# Patient Record
Sex: FEMALE | Race: BLACK OR AFRICAN AMERICAN | NOT HISPANIC OR LATINO | Employment: FULL TIME | URBAN - METROPOLITAN AREA
[De-identification: names, ages, dates, MRNs, and addresses within clinical notes are randomized per-mention and may not be internally consistent; named-entity substitution may affect disease eponyms.]

---

## 2020-12-02 ENCOUNTER — TELEPHONE (OUTPATIENT)
Dept: GASTROENTEROLOGY | Facility: CLINIC | Age: 65
End: 2020-12-02

## 2020-12-02 NOTE — TELEPHONE ENCOUNTER
----- Message from Lyndsey Freeman LPN sent at 12/1/2020  3:08 PM CST -----  Contact: patient    ----- Message -----  From: Shayy Riggins  Sent: 12/1/2020   2:09 PM CST  To: [facility] Gastro Clinical Staff    Type: Needs Medical Advice  Who Called:  patient    Best Call Back Number: [phone] (home)     Additional Information: would like to have a colonoscopy

## 2020-12-02 NOTE — TELEPHONE ENCOUNTER
Spoke with pt and discussed dates available to schedule cscope. She will call us back at a later time to schedule. Phone number provided.

## 2020-12-04 ENCOUNTER — TELEPHONE (OUTPATIENT)
Dept: GASTROENTEROLOGY | Facility: CLINIC | Age: 65
End: 2020-12-04

## 2020-12-04 DIAGNOSIS — Z01.812 ENCOUNTER FOR PREOPERATIVE SCREENING LABORATORY TESTING FOR COVID-19 VIRUS: ICD-10-CM

## 2020-12-04 DIAGNOSIS — Z11.52 ENCOUNTER FOR PREOPERATIVE SCREENING LABORATORY TESTING FOR COVID-19 VIRUS: ICD-10-CM

## 2020-12-17 ENCOUNTER — TELEPHONE (OUTPATIENT)
Dept: GASTROENTEROLOGY | Facility: CLINIC | Age: 65
End: 2020-12-17

## 2020-12-17 NOTE — TELEPHONE ENCOUNTER
----- Message from Bety Ibarra sent at 12/17/2020  7:28 AM CST -----  Regarding: advice  Contact: self  Type: Needs Medical Advice  Who Called:  self  Symptoms (please be specific):    How long has patient had these symptoms:    Pharmacy name and phone #:  [facility]  Best Call Back Number: [phone]   Additional Information: Patient states the medication for prep for the procedure is $ 42.00. Patient requesting a less expensive medication. The wife requesting to get medication for prep no later than tomorrow. The pharmacy is closed Saturday.

## 2020-12-18 ENCOUNTER — TELEPHONE (OUTPATIENT)
Dept: GASTROENTEROLOGY | Facility: CLINIC | Age: 65
End: 2020-12-18

## 2020-12-18 NOTE — TELEPHONE ENCOUNTER
----- Message from Carla Heart sent at 12/18/2020  8:56 AM CST -----  Contact: Pt  Pt has colonoscopy scheduled for Tuesday 12/22 and was supposed to have some instructions emailed to her but has not received them. Please call back at [phone] or email instructions to [email or web address]

## 2020-12-19 ENCOUNTER — LAB VISIT (OUTPATIENT)
Dept: FAMILY MEDICINE | Facility: CLINIC | Age: 65
End: 2020-12-19
Payer: MEDICARE

## 2020-12-19 DIAGNOSIS — Z01.812 ENCOUNTER FOR PREOPERATIVE SCREENING LABORATORY TESTING FOR COVID-19 VIRUS: ICD-10-CM

## 2020-12-19 DIAGNOSIS — Z11.52 ENCOUNTER FOR PREOPERATIVE SCREENING LABORATORY TESTING FOR COVID-19 VIRUS: ICD-10-CM

## 2020-12-19 PROCEDURE — U0003 INFECTIOUS AGENT DETECTION BY NUCLEIC ACID (DNA OR RNA); SEVERE ACUTE RESPIRATORY SYNDROME CORONAVIRUS 2 (SARS-COV-2) (CORONAVIRUS DISEASE [COVID-19]), AMPLIFIED PROBE TECHNIQUE, MAKING USE OF HIGH THROUGHPUT TECHNOLOGIES AS DESCRIBED BY CMS-2020-01-R: HCPCS

## 2020-12-20 LAB — SARS-COV-2 RNA RESP QL NAA+PROBE: NOT DETECTED

## 2020-12-22 ENCOUNTER — ANESTHESIA (OUTPATIENT)
Dept: ENDOSCOPY | Facility: HOSPITAL | Age: 65
End: 2020-12-22
Payer: MEDICARE

## 2020-12-22 ENCOUNTER — ANESTHESIA EVENT (OUTPATIENT)
Dept: ENDOSCOPY | Facility: HOSPITAL | Age: 65
End: 2020-12-22
Payer: MEDICARE

## 2020-12-22 ENCOUNTER — HOSPITAL ENCOUNTER (OUTPATIENT)
Facility: HOSPITAL | Age: 65
Discharge: HOME | End: 2020-12-22
Attending: INTERNAL MEDICINE | Admitting: INTERNAL MEDICINE
Payer: MEDICARE

## 2020-12-22 VITALS
HEIGHT: 65 IN | SYSTOLIC BLOOD PRESSURE: 117 MMHG | BODY MASS INDEX: 29.16 KG/M2 | DIASTOLIC BLOOD PRESSURE: 73 MMHG | RESPIRATION RATE: 18 BRPM | HEART RATE: 69 BPM | OXYGEN SATURATION: 97 % | TEMPERATURE: 98 F | WEIGHT: 175 LBS

## 2020-12-22 DIAGNOSIS — Z12.11 COLON CANCER SCREENING: ICD-10-CM

## 2020-12-22 PROCEDURE — 88305 TISSUE EXAM BY PATHOLOGIST: ICD-10-PCS | Mod: 26,,, | Performed by: PATHOLOGY

## 2020-12-22 PROCEDURE — 37000009 HC ANESTHESIA EA ADD 15 MINS: Performed by: INTERNAL MEDICINE

## 2020-12-22 PROCEDURE — D9220A PRA ANESTHESIA: ICD-10-PCS | Mod: PT,ANES,, | Performed by: ANESTHESIOLOGY

## 2020-12-22 PROCEDURE — 88305 TISSUE EXAM BY PATHOLOGIST: CPT | Performed by: PATHOLOGY

## 2020-12-22 PROCEDURE — 45380 COLONOSCOPY AND BIOPSY: CPT | Mod: PT,,, | Performed by: INTERNAL MEDICINE

## 2020-12-22 PROCEDURE — 27201012 HC FORCEPS, HOT/COLD, DISP: Performed by: INTERNAL MEDICINE

## 2020-12-22 PROCEDURE — 88305 TISSUE EXAM BY PATHOLOGIST: CPT | Mod: 26,,, | Performed by: PATHOLOGY

## 2020-12-22 PROCEDURE — 63600175 PHARM REV CODE 636 W HCPCS: Performed by: REGISTERED NURSE

## 2020-12-22 PROCEDURE — 37000008 HC ANESTHESIA 1ST 15 MINUTES: Performed by: INTERNAL MEDICINE

## 2020-12-22 PROCEDURE — 25000003 PHARM REV CODE 250: Performed by: REGISTERED NURSE

## 2020-12-22 PROCEDURE — D9220A PRA ANESTHESIA: Mod: PT,CRNA,, | Performed by: REGISTERED NURSE

## 2020-12-22 PROCEDURE — D9220A PRA ANESTHESIA: ICD-10-PCS | Mod: PT,CRNA,, | Performed by: REGISTERED NURSE

## 2020-12-22 PROCEDURE — D9220A PRA ANESTHESIA: Mod: PT,ANES,, | Performed by: ANESTHESIOLOGY

## 2020-12-22 PROCEDURE — 45380 COLONOSCOPY AND BIOPSY: CPT | Performed by: INTERNAL MEDICINE

## 2020-12-22 PROCEDURE — 45380 PR COLONOSCOPY,BIOPSY: ICD-10-PCS | Mod: PT,,, | Performed by: INTERNAL MEDICINE

## 2020-12-22 RX ORDER — OLMESARTAN MEDOXOMIL AND HYDROCHLOROTHIAZIDE 40/12.5 40; 12.5 MG/1; MG/1
0.5 TABLET ORAL DAILY
COMMUNITY

## 2020-12-22 RX ORDER — ATORVASTATIN CALCIUM 10 MG/1
20 TABLET, FILM COATED ORAL NIGHTLY
COMMUNITY

## 2020-12-22 RX ORDER — SODIUM CHLORIDE 9 MG/ML
INJECTION, SOLUTION INTRAVENOUS CONTINUOUS
Status: DISCONTINUED | OUTPATIENT
Start: 2020-12-22 | End: 2020-12-22 | Stop reason: HOSPADM

## 2020-12-22 RX ORDER — LIDOCAINE HYDROCHLORIDE 20 MG/ML
INJECTION INTRAVENOUS
Status: DISCONTINUED | OUTPATIENT
Start: 2020-12-22 | End: 2020-12-22

## 2020-12-22 RX ORDER — PROPOFOL 10 MG/ML
VIAL (ML) INTRAVENOUS
Status: DISCONTINUED | OUTPATIENT
Start: 2020-12-22 | End: 2020-12-22

## 2020-12-22 RX ADMIN — PROPOFOL 70 MG: 10 INJECTION, EMULSION INTRAVENOUS at 12:12

## 2020-12-22 RX ADMIN — SODIUM CHLORIDE 1000 ML: 0.9 SOLUTION INTRAVENOUS at 12:12

## 2020-12-22 RX ADMIN — SODIUM CHLORIDE 200 ML: 0.9 SOLUTION INTRAVENOUS at 01:12

## 2020-12-22 RX ADMIN — PROPOFOL 30 MG: 10 INJECTION, EMULSION INTRAVENOUS at 01:12

## 2020-12-22 RX ADMIN — SODIUM CHLORIDE 300 ML: 0.9 SOLUTION INTRAVENOUS at 01:12

## 2020-12-22 RX ADMIN — PROPOFOL 50 MG: 10 INJECTION, EMULSION INTRAVENOUS at 12:12

## 2020-12-22 RX ADMIN — LIDOCAINE HYDROCHLORIDE 100 MG: 20 INJECTION, SOLUTION INTRAVENOUS at 12:12

## 2020-12-22 NOTE — TRANSFER OF CARE
"Anesthesia Transfer of Care Note    Patient: Ambar Rivera    Procedure(s) Performed: Procedure(s) (LRB):  COLONOSCOPY (N/A)    Patient location: GI    Anesthesia Type: general    Transport from OR: Transported from OR on 2-3 L/min O2 by NC with adequate spontaneous ventilation    Post pain: adequate analgesia    Post assessment: tolerated procedure well and no apparent anesthetic complications    Post vital signs: stable    Level of consciousness: sedated    Nausea/Vomiting: no nausea/vomiting    Complications: none    Transfer of care protocol was followed      Last vitals:   Visit Vitals  /86 (BP Location: Left arm, Patient Position: Lying)   Pulse 86   Temp 36.6 °C (97.9 °F) (Skin)   Resp 17   Ht 5' 5" (1.651 m)   Wt 79.4 kg (175 lb)   SpO2 100%   Breastfeeding No   BMI 29.12 kg/m²     "

## 2020-12-22 NOTE — H&P
History & Physical - Short Stay  Gastroenterology    SUBJECTIVE:     Procedure: Colonoscopy    Chief Complaint/Indication for Procedure: Screening    PTA Medications   Medication Sig    atorvastatin (LIPITOR) 10 MG tablet Take 20 mg by mouth every evening.    olmesartan-hydrochlorothiazide (BENICAR HCT) 40-12.5 mg Tab Take 0.5 tablets by mouth once daily.     Review of patient's allergies indicates:  No Known Allergies     Past Medical History:   Diagnosis Date    Hyperlipidemia     Hypertension      Past Surgical History:   Procedure Laterality Date    BILATERAL TUBAL LIGATION  1985     History reviewed. No pertinent family history.  Social History     Tobacco Use    Smoking status: Never Smoker    Smokeless tobacco: Never Used   Substance Use Topics    Alcohol use: Never     Frequency: Never    Drug use: Never     OBJECTIVE:     Vital Signs (Most Recent)  Temp: 97.9 °F (36.6 °C) (12/22/20 1116)  Pulse: 86 (12/22/20 1116)  Resp: 17 (12/22/20 1116)  BP: 120/86 (12/22/20 1116)  SpO2: 100 % (12/22/20 1116)    Physical Exam:                                                       GENERAL:  Comfortable, in no acute distress.                                 HEENT EXAM:  Nonicteric.  No adenopathy.  Oropharynx is clear.               NECK:  Supple.                                                               LUNGS:  Clear.                                                               CARDIAC:  Regular rate and rhythm.  S1, S2.  No murmur.                      ABDOMEN:  Soft, positive bowel sounds, nontender.  No hepatosplenomegaly or masses.  No rebound or guarding.                                             EXTREMITIES:  No edema.     MENTAL STATUS:  Normal, alert and oriented.    ASSESSMENT/PLAN:     Assessment: Screening    Plan: Colonoscopy    Anesthesia Plan: General    ASA Grade: ASA 2 - Patient with mild systemic disease with no functional limitations    MALLAMPATI SCORE:  II (hard and soft palate, upper  portion of tonsils anduvula visible)

## 2020-12-22 NOTE — ANESTHESIA PREPROCEDURE EVALUATION
12/22/2020  Ambar Rivera is a 65 y.o., female.    Anesthesia Evaluation          Review of Systems  Anesthesia Hx:  No problems with previous Anesthesia   Social:  Non-Smoker    Cardiovascular:   Hypertension    Pulmonary:  Pulmonary Normal    Hepatic/GI:   Bowel Prep.    Neurological:  Neurology Normal    Endocrine:  Endocrine Normal        Physical Exam  General:  Obesity                 Anesthesia Plan  Type of Anesthesia, risks & benefits discussed:  Anesthesia Type:  general  Patient's Preference:   Intra-op Monitoring Plan: standard ASA monitors  Intra-op Monitoring Plan Comments:   Post Op Pain Control Plan:   Post Op Pain Control Plan Comments:   Induction:   IV  Beta Blocker:  Patient is not currently on a Beta-Blocker (No further documentation required).       Informed Consent: Patient understands risks and agrees with Anesthesia plan.  Questions answered. Anesthesia consent signed with patient.  ASA Score: 2     Day of Surgery Review of History & Physical:    H&P update referred to the surgeon.     Anesthesia Plan Notes: I have personally evaluated the patient and discussed risk/benefits/alternatives of general anesthesia.        Ready For Surgery From Anesthesia Perspective.

## 2020-12-22 NOTE — [employer] PATIENT INSTRUCTIONS
Discharge Summary/Instructions after an Endoscopic Procedure  Patient Name: Ambar Rivera  Patient MRN: [id number]  Patient YOB: 1955  Tuesday, December 22, 2020  Syed Aguirre MD  RESTRICTIONS:  During your procedure today, you received medications for sedation.  These   medications may affect your judgment, balance and coordination.  Therefore,   for 24 hours, you have the following restrictions:   - DO NOT drive a car, operate machinery, make legal/financial decisions,   sign important papers or drink alcohol.    ACTIVITY:  Today: no heavy lifting, straining or running due to procedural   sedation/anesthesia.  The following day: return to full activity including work.  DIET:  Eat and drink normally unless instructed otherwise.     TREATMENT FOR COMMON SIDE EFFECTS:  - Mild abdominal pain, nausea, belching, bloating or excessive gas:  rest,   eat lightly and use a heating pad.  - Sore Throat: treat with throat lozenges and/or gargle with warm salt   water.  - Because air was used during the procedure, expelling large amounts of air   from your rectum or belching is normal.  - If a bowel prep was taken, you may not have a bowel movement for 1-3 days.    This is normal.  SYMPTOMS TO WATCH FOR AND REPORT TO YOUR PHYSICIAN:  1. Abdominal pain or bloating, other than gas cramps.  2. Chest pain.  3. Back pain.  4. Signs of infection such as: chills or fever occurring within 24 hours   after the procedure.  5. Rectal bleeding, which would show as bright red, maroon, or black stools.   (A tablespoon of blood from the rectum is not serious, especially if   hemorrhoids are present.)  6. Vomiting.  7. Weakness or dizziness.  GO DIRECTLY TO THE NEAREST EMERGENCY ROOM IF YOU HAVE ANY OF THE FOLLOWING:      Difficulty breathing              Chills and/or fever over 101 F   Persistent vomiting and/or vomiting blood   Severe abdominal pain   Severe chest pain   Black, tarry stools   Bleeding- more than one  tablespoon   Any other symptom or condition that you feel may need urgent attention  Your doctor recommends these additional instructions:  If any biopsies were taken, your doctors clinic will contact you in 1 to 2   weeks with any results.  - Repeat colonoscopy in 5 years for surveillance.   - Discharge patient to home.   - Advance diet as tolerated.   - Continue present medications.   - Await pathology results.   - Written discharge instructions were provided to the patient.  For questions, problems or results please call your physician - Syed Aguirre MD at Work:  [phone].  [facility], EMERGENCY ROOM PHONE NUMBER: [phone]  IF A COMPLICATION OR EMERGENCY SITUATION ARISES AND YOU ARE UNABLE TO REACH   YOUR PHYSICIAN - GO DIRECTLY TO THE EMERGENCY ROOM.  Syed Aguirre MD  12/22/2020 1:21:39 PM  This report has been verified and signed electronically.  [employer]

## 2020-12-28 LAB
FINAL PATHOLOGIC DIAGNOSIS: NORMAL
GROSS: NORMAL
Lab: NORMAL

## 2020-12-28 NOTE — ANESTHESIA POSTPROCEDURE EVALUATION
Anesthesia Post Evaluation    Patient: Ambar Rivera    Procedure(s) Performed: Procedure(s) (LRB):  COLONOSCOPY (N/A)    Final Anesthesia Type: general      Patient location during evaluation: PACU  Patient participation: Yes- Able to Participate  Level of consciousness: awake and alert and oriented  Post-procedure vital signs: reviewed and stable  Pain management: adequate  Airway patency: patent    PONV status at discharge: No PONV  Anesthetic complications: no      Cardiovascular status: blood pressure returned to baseline  Respiratory status: unassisted, spontaneous ventilation and room air  Hydration status: euvolemic  Follow-up not needed.          Vitals Value Taken Time   /73 12/22/20 1335   Temp  12/28/20 1119   Pulse 69 12/22/20 1335   Resp 18 12/22/20 1335   SpO2 97 % 12/22/20 1335         Event Time   Out of Recovery 13:51:48         Pain/Rosaura Score: No data recorded

## 2025-02-04 ENCOUNTER — OFFICE VISIT (OUTPATIENT)
Dept: CARDIOLOGY | Facility: CLINIC | Age: 70
End: 2025-02-04
Payer: MEDICARE

## 2025-02-04 VITALS
BODY MASS INDEX: 30.04 KG/M2 | WEIGHT: 180.31 LBS | DIASTOLIC BLOOD PRESSURE: 87 MMHG | SYSTOLIC BLOOD PRESSURE: 129 MMHG | HEIGHT: 65 IN | HEART RATE: 80 BPM

## 2025-02-04 DIAGNOSIS — I10 HYPERTENSION, UNSPECIFIED TYPE: ICD-10-CM

## 2025-02-04 DIAGNOSIS — I38 VALVULAR HEART DISEASE: Primary | ICD-10-CM

## 2025-02-04 DIAGNOSIS — E78.5 HYPERLIPIDEMIA, UNSPECIFIED HYPERLIPIDEMIA TYPE: ICD-10-CM

## 2025-02-04 PROCEDURE — 3008F BODY MASS INDEX DOCD: CPT | Mod: CPTII,S$GLB,, | Performed by: INTERNAL MEDICINE

## 2025-02-04 PROCEDURE — 99999 PR PBB SHADOW E&M-NEW PATIENT-LVL III: CPT | Mod: PBBFAC,,, | Performed by: INTERNAL MEDICINE

## 2025-02-04 PROCEDURE — 93010 ELECTROCARDIOGRAM REPORT: CPT | Mod: S$GLB,,, | Performed by: INTERNAL MEDICINE

## 2025-02-04 PROCEDURE — 93005 ELECTROCARDIOGRAM TRACING: CPT | Mod: PO

## 2025-02-04 PROCEDURE — 1160F RVW MEDS BY RX/DR IN RCRD: CPT | Mod: CPTII,S$GLB,, | Performed by: INTERNAL MEDICINE

## 2025-02-04 PROCEDURE — 99204 OFFICE O/P NEW MOD 45 MIN: CPT | Mod: S$GLB,,, | Performed by: INTERNAL MEDICINE

## 2025-02-04 PROCEDURE — 1101F PT FALLS ASSESS-DOCD LE1/YR: CPT | Mod: CPTII,S$GLB,, | Performed by: INTERNAL MEDICINE

## 2025-02-04 PROCEDURE — 3074F SYST BP LT 130 MM HG: CPT | Mod: CPTII,S$GLB,, | Performed by: INTERNAL MEDICINE

## 2025-02-04 PROCEDURE — 3079F DIAST BP 80-89 MM HG: CPT | Mod: CPTII,S$GLB,, | Performed by: INTERNAL MEDICINE

## 2025-02-04 PROCEDURE — 3288F FALL RISK ASSESSMENT DOCD: CPT | Mod: CPTII,S$GLB,, | Performed by: INTERNAL MEDICINE

## 2025-02-04 PROCEDURE — 1159F MED LIST DOCD IN RCRD: CPT | Mod: CPTII,S$GLB,, | Performed by: INTERNAL MEDICINE

## 2025-02-04 PROCEDURE — 1126F AMNT PAIN NOTED NONE PRSNT: CPT | Mod: CPTII,S$GLB,, | Performed by: INTERNAL MEDICINE

## 2025-02-04 NOTE — PROGRESS NOTES
Subjective:    Patient ID:  Ambar Rivera is a 69 y.o. female patient here for evaluation Establish Care      History of Present Illness:  New patient cardiac visit.  History of hypertension dyslipidemia.  Patient has seen previously by outside Cardiology.  By history noninvasive cardiac assessment in the past have been negative for ischemic structural arrhythmic heart disease.  Recent labs by history also within normal limits.  Occasional lightheaded spells, unexplained.  No definite syncope.  Denies angina/dyspnea/PND orthopnea.  No edema.    No history of CVA Ca.    No known chronic liver disease chronic kidney disease.  No pulmonary disease.  Patient denies diabetes mellitus tobacco use, general health good            Review of patient's allergies indicates:  No Known Allergies    Past Medical History:   Diagnosis Date    Hyperlipidemia     Hypertension      Past Surgical History:   Procedure Laterality Date    BILATERAL TUBAL LIGATION  1985    COLONOSCOPY N/A 12/22/2020    Procedure: COLONOSCOPY;  Surgeon: Syed Aguirre MD;  Location: [facility];  Service: Endoscopy;  Laterality: N/A;     Social History     Tobacco Use    Smoking status: Never    Smokeless tobacco: Never   Substance Use Topics    Alcohol use: Never    Drug use: Never        Review of Systems:    As noted in HPI in addition      REVIEW OF SYSTEMS  Review of Systems   Constitutional: Negative for decreased appetite, diaphoresis, night sweats, weight gain and weight loss.   HENT:  Negative for nosebleeds and odynophagia.    Eyes:  Negative for double vision and photophobia.   Cardiovascular:  Negative for chest pain, claudication, cyanosis, dyspnea on exertion, irregular heartbeat, leg swelling, near-syncope, orthopnea, palpitations, paroxysmal nocturnal dyspnea and syncope.   Respiratory:  Negative for cough, hemoptysis, shortness of breath and wheezing.    Hematologic/Lymphatic: Negative for adenopathy.   Skin:  Negative for flushing, skin  cancer and suspicious lesions.   Musculoskeletal:  Negative for gout, myalgias and neck pain.   Gastrointestinal:  Negative for abdominal pain, heartburn, hematemesis and hematochezia.   Genitourinary:  Negative for bladder incontinence, hesitancy and nocturia.   Neurological:  Negative for focal weakness, headaches, light-headedness and paresthesias.   Psychiatric/Behavioral:  Negative for memory loss and substance abuse.               Objective:        Vitals:    02/04/25 1351   BP: 129/87   Pulse: 80       Lab Results   Component Value Date    WBC 5.6 10/18/2023    HGB 11.6 10/18/2023    HCT 37.3 10/18/2023     10/18/2023    CHOL 175 09/24/2021    TRIG 159 (H) 09/24/2021    HDL 53 09/24/2021    ALT 22 11/30/2021    AST 36 11/30/2021     11/30/2021    K 3.0 (L) 11/30/2021     11/30/2021    CREATININE 0.82 11/30/2021    BUN 15 11/30/2021    CO2 30 11/30/2021    TSH 1.51 10/18/2023        ECHOCARDIOGRAM RESULTS  No results found for this or any previous visit.    No results found for this or any previous visit.          CURRENT/PREVIOUS VISIT EKG  Results for orders placed or performed during the hospital encounter of 11/30/21   EKG 12-lead    Collection Time: 11/30/21  6:33 AM    Narrative    Test Reason : I10,    Vent. Rate : 078 BPM     Atrial Rate : 078 BPM     P-R Int : 160 ms          QRS Dur : 076 ms      QT Int : 408 ms       P-R-T Axes : 043 -14 019 degrees     QTc Int : 465 ms    Program found technically poor ECG  Normal sinus rhythm  Minimal voltage criteria for LVH, may be normal variant ( R in aVL )  Borderline Abnormal ECG  No previous ECGs available  Confirmed by Murray Pagan MD ([id number]) on 11/30/2021 12:17:19 PM    Referred By: JESSICA   SELF           Confirmed By:Murray Pagan MD     No valid procedures specified.   No results found for this or any previous visit.    No valid procedures specified.    PHYSICAL EXAM  GENERAL: well built, well nourished, well-developed in no apparent  distress alert and oriented.   HEENT: Normocephalic. Pupils normal and conjunctivae normal.  Mucous membranes normal, no cyanosis or icterus, trachea central,no pallor or icterus is noted..   NECK: No JVD. No bruit..   THYROID: Thyroid not enlarged. No nodules present..   CARDIAC:  Normal S1-S2.  No murmur rub click or gallop.  PMI nondisplaced.    LUNGS: Clear to auscultation. No wheezing or rhonchi..   ABDOMEN: Soft no masses or organomegaly.  No abdomen pulsations or bruits.  Normal bowel sounds. No pulsations and no masses felt, No guarding or rebound.   URINARY: No pendleton catheter   EXTREMITIES: No cyanosis, clubbing or edema noted at this time., no calf tenderness bilaterally.   PERIPHERAL VASCULAR SYSTEM: Good palpable distal pulses.  2+ femoral, popliteal and pedal pulses.  No bruits    CENTRAL NERVOUS SYSTEM: No focal motor or sensory deficits noted.   SKIN: Skin without lesions, moist, well perfused.   MUSCLE STRENGTH & TONE: No noteable weakness, atrophy or abnormal movement    I HAVE REVIEWED :    The vital signs, nurses notes, and all the pertinent radiology and labs.         Current Outpatient Medications   Medication Instructions    atorvastatin (LIPITOR) 20 mg, Nightly    COVID-19 (COMIRNATY 2024-25, 12Y UP,,PF,) 30 mcg/0.3 mL IM vaccine (>/= 13 yo) Intramuscular    olmesartan-hydrochlorothiazide (BENICAR HCT) 40-12.5 mg Tab 0.5 tablets, Daily          Assessment:   Hypertension  Dyslipidemia    Followed in the past by outside Cardiology.  Noninvasive cardiac assessment by history and labs unremarkable.    Occasional lightheaded spells      Plan:     Take blood pressure when feeling lightheaded to rule out hypotension.  Otherwise continue present medications.  Copy of last cardiac evaluation.  Update echo.  Call results, EF normal,  one month.  Consider decreasing current dose of blood pressure medication.  Keep home blood pressure log        No follow-ups on file.

## 2025-02-05 LAB
OHS QRS DURATION: 74 MS
OHS QTC CALCULATION: 450 MS

## 2025-02-14 ENCOUNTER — HOSPITAL ENCOUNTER (OUTPATIENT)
Dept: CARDIOLOGY | Facility: HOSPITAL | Age: 70
Discharge: HOME | End: 2025-02-14
Attending: INTERNAL MEDICINE
Payer: MEDICARE

## 2025-02-14 VITALS — BODY MASS INDEX: 31.99 KG/M2 | HEIGHT: 65 IN | WEIGHT: 192 LBS

## 2025-02-14 DIAGNOSIS — I38 VALVULAR HEART DISEASE: ICD-10-CM

## 2025-02-14 LAB
ASCENDING AORTA: 3.36 CM
AV INDEX (PROSTH): 0.76
AV MEAN GRADIENT: 4 MMHG
AV PEAK GRADIENT: 8 MMHG
AV REGURGITATION PRESSURE HALF TIME: 901 MS
AV VALVE AREA BY VELOCITY RATIO: 2.7 CM²
AV VALVE AREA: 2.6 CM²
AV VELOCITY RATIO: 0.79
BSA FOR ECHO PROCEDURE: 2 M2
CV ECHO LV RWT: 0.3 CM
DOP CALC AO PEAK VEL: 1.4 M/S
DOP CALC AO VTI: 31.1 CM
DOP CALC LVOT AREA: 3.5 CM2
DOP CALC LVOT DIAMETER: 2.1 CM
DOP CALC LVOT PEAK VEL: 1.1 M/S
DOP CALC LVOT STROKE VOLUME: 81.4 CM3
DOP CALCLVOT PEAK VEL VTI: 23.5 CM
E WAVE DECELERATION TIME: 143 MSEC
E/A RATIO: 1.25
E/E' RATIO: 11 M/S
ECHO LV POSTERIOR WALL: 0.7 CM (ref 0.6–1.1)
FRACTIONAL SHORTENING: 34.8 % (ref 28–44)
INTERVENTRICULAR SEPTUM: 0.8 CM (ref 0.6–1.1)
LEFT ATRIUM AREA SYSTOLIC (APICAL 2 CHAMBER): 19.32 CM2
LEFT ATRIUM AREA SYSTOLIC (APICAL 4 CHAMBER): 14.57 CM2
LEFT ATRIUM SIZE: 3.1 CM
LEFT ATRIUM VOLUME INDEX MOD: 26 ML/M2
LEFT ATRIUM VOLUME MOD: 51 ML
LEFT INTERNAL DIMENSION IN SYSTOLE: 3 CM (ref 2.1–4)
LEFT VENTRICLE DIASTOLIC VOLUME INDEX: 49.51 ML/M2
LEFT VENTRICLE DIASTOLIC VOLUME: 96.04 ML
LEFT VENTRICLE END SYSTOLIC VOLUME APICAL 2 CHAMBER: 55.92 ML
LEFT VENTRICLE END SYSTOLIC VOLUME APICAL 4 CHAMBER: 37.98 ML
LEFT VENTRICLE MASS INDEX: 55.9 G/M2
LEFT VENTRICLE SYSTOLIC VOLUME INDEX: 17.9 ML/M2
LEFT VENTRICLE SYSTOLIC VOLUME: 34.67 ML
LEFT VENTRICULAR INTERNAL DIMENSION IN DIASTOLE: 4.6 CM (ref 3.5–6)
LEFT VENTRICULAR MASS: 108.5 G
LV LATERAL E/E' RATIO: 11.9 M/S
LV SEPTAL E/E' RATIO: 10.6 M/S
LVED V (TEICH): 96.04 ML
LVES V (TEICH): 34.67 ML
LVOT MG: 2.16 MMHG
LVOT MV: 0.67 CM/S
MV PEAK A VEL: 0.76 M/S
MV PEAK E VEL: 0.95 M/S
OHS CV RV/LV RATIO: 0.85 CM
PISA AR MAX VEL: 3.56 M/S
PISA MRMAX VEL: 5.81 M/S
PISA TR MAX VEL: 2.5 M/S
PULM VEIN S/D RATIO: 0.76
PV PEAK D VEL: 0.59 M/S
PV PEAK S VEL: 0.45 M/S
RA VOL SYS: 40.78 ML
RIGHT ATRIAL AREA: 13.8 CM2
RIGHT ATRIUM VOLUME AREA LENGTH APICAL 4 CHAMBER: 40.1 ML
RIGHT VENTRICLE DIASTOLIC BASEL DIMENSION: 3.9 CM
RIGHT VENTRICLE DIASTOLIC LENGTH: 7 CM
RIGHT VENTRICLE DIASTOLIC MID DIMENSION: 2.6 CM
RIGHT VENTRICULAR END-DIASTOLIC DIMENSION: 3.87 CM
RIGHT VENTRICULAR LENGTH IN DIASTOLE (APICAL 4-CHAMBER VIEW): 7.01 CM
RV MID DIAMA: 2.6 CM
RV TISSUE DOPPLER FREE WALL SYSTOLIC VELOCITY 1 (APICAL 4 CHAMBER VIEW): 11.43 CM/S
SINUS: 2.51 CM
STJ: 2.65 CM
TDI LATERAL: 0.08 M/S
TDI SEPTAL: 0.09 M/S
TDI: 0.09 M/S
TR MAX PG: 25 MMHG
TRICUSPID ANNULAR PLANE SYSTOLIC EXCURSION: 1.82 CM
Z-SCORE OF LEFT VENTRICULAR DIMENSION IN END DIASTOLE: -1.81
Z-SCORE OF LEFT VENTRICULAR DIMENSION IN END SYSTOLE: -0.97

## 2025-02-14 PROCEDURE — 93306 TTE W/DOPPLER COMPLETE: CPT | Mod: PO

## 2025-02-17 LAB
ASCENDING AORTA: 3.36 CM
AV INDEX (PROSTH): 0.76
AV MEAN GRADIENT: 4 MMHG
AV PEAK GRADIENT: 8 MMHG
AV REGURGITATION PRESSURE HALF TIME: 901 MS
AV VALVE AREA BY VELOCITY RATIO: 2.7 CM²
AV VALVE AREA: 2.6 CM²
AV VELOCITY RATIO: 0.79
BSA FOR ECHO PROCEDURE: 2 M2
CV ECHO LV RWT: 0.3 CM
DOP CALC AO PEAK VEL: 1.4 M/S
DOP CALC AO VTI: 31.1 CM
DOP CALC LVOT AREA: 3.5 CM2
DOP CALC LVOT DIAMETER: 2.1 CM
DOP CALC LVOT PEAK VEL: 1.1 M/S
DOP CALC LVOT STROKE VOLUME: 81.4 CM3
DOP CALCLVOT PEAK VEL VTI: 23.5 CM
E WAVE DECELERATION TIME: 143 MSEC
E/A RATIO: 1.25
E/E' RATIO: 11 M/S
ECHO LV POSTERIOR WALL: 0.7 CM (ref 0.6–1.1)
FRACTIONAL SHORTENING: 34.8 % (ref 28–44)
INTERVENTRICULAR SEPTUM: 0.8 CM (ref 0.6–1.1)
LEFT ATRIUM AREA SYSTOLIC (APICAL 2 CHAMBER): 19.32 CM2
LEFT ATRIUM AREA SYSTOLIC (APICAL 4 CHAMBER): 14.57 CM2
LEFT ATRIUM SIZE: 3.1 CM
LEFT ATRIUM VOLUME INDEX MOD: 26 ML/M2
LEFT ATRIUM VOLUME MOD: 51 ML
LEFT INTERNAL DIMENSION IN SYSTOLE: 3 CM (ref 2.1–4)
LEFT VENTRICLE DIASTOLIC VOLUME INDEX: 49.51 ML/M2
LEFT VENTRICLE DIASTOLIC VOLUME: 96.04 ML
LEFT VENTRICLE END SYSTOLIC VOLUME APICAL 2 CHAMBER: 55.92 ML
LEFT VENTRICLE END SYSTOLIC VOLUME APICAL 4 CHAMBER: 37.98 ML
LEFT VENTRICLE MASS INDEX: 55.9 G/M2
LEFT VENTRICLE SYSTOLIC VOLUME INDEX: 17.9 ML/M2
LEFT VENTRICLE SYSTOLIC VOLUME: 34.67 ML
LEFT VENTRICULAR INTERNAL DIMENSION IN DIASTOLE: 4.6 CM (ref 3.5–6)
LEFT VENTRICULAR MASS: 108.5 G
LV LATERAL E/E' RATIO: 11.9 M/S
LV SEPTAL E/E' RATIO: 10.6 M/S
LVED V (TEICH): 96.04 ML
LVES V (TEICH): 34.67 ML
LVOT MG: 2.16 MMHG
LVOT MV: 0.67 CM/S
MV PEAK A VEL: 0.76 M/S
MV PEAK E VEL: 0.95 M/S
OHS CV RV/LV RATIO: 0.85 CM
PISA AR MAX VEL: 3.56 M/S
PISA MRMAX VEL: 5.81 M/S
PISA TR MAX VEL: 2.5 M/S
PULM VEIN S/D RATIO: 0.76
PV PEAK D VEL: 0.59 M/S
PV PEAK S VEL: 0.45 M/S
RA PRESSURE ESTIMATED: 3 MMHG
RA VOL SYS: 40.78 ML
RIGHT ATRIAL AREA: 13.8 CM2
RIGHT ATRIUM VOLUME AREA LENGTH APICAL 4 CHAMBER: 40.1 ML
RIGHT VENTRICLE DIASTOLIC BASEL DIMENSION: 3.9 CM
RIGHT VENTRICLE DIASTOLIC LENGTH: 7 CM
RIGHT VENTRICLE DIASTOLIC MID DIMENSION: 2.6 CM
RIGHT VENTRICULAR END-DIASTOLIC DIMENSION: 3.87 CM
RIGHT VENTRICULAR LENGTH IN DIASTOLE (APICAL 4-CHAMBER VIEW): 7.01 CM
RV MID DIAMA: 2.6 CM
RV TB RVSP: 6 MMHG
RV TISSUE DOPPLER FREE WALL SYSTOLIC VELOCITY 1 (APICAL 4 CHAMBER VIEW): 11.43 CM/S
SINUS: 2.51 CM
STJ: 2.65 CM
TDI LATERAL: 0.08 M/S
TDI SEPTAL: 0.09 M/S
TDI: 0.09 M/S
TR MAX PG: 25 MMHG
TRICUSPID ANNULAR PLANE SYSTOLIC EXCURSION: 1.82 CM
TV REST PULMONARY ARTERY PRESSURE: 28 MMHG
Z-SCORE OF LEFT VENTRICULAR DIMENSION IN END DIASTOLE: -1.81
Z-SCORE OF LEFT VENTRICULAR DIMENSION IN END SYSTOLE: -0.97

## 2025-02-21 ENCOUNTER — OFFICE VISIT (OUTPATIENT)
Dept: CARDIOLOGY | Facility: CLINIC | Age: 70
End: 2025-02-21
Attending: INTERNAL MEDICINE
Payer: MEDICARE

## 2025-02-21 VITALS
HEIGHT: 65 IN | BODY MASS INDEX: 30.59 KG/M2 | SYSTOLIC BLOOD PRESSURE: 144 MMHG | HEART RATE: 74 BPM | WEIGHT: 183.63 LBS | DIASTOLIC BLOOD PRESSURE: 80 MMHG

## 2025-02-21 DIAGNOSIS — I38 VALVULAR HEART DISEASE: ICD-10-CM

## 2025-02-21 DIAGNOSIS — I10 PRIMARY HYPERTENSION: Primary | ICD-10-CM

## 2025-02-21 DIAGNOSIS — E78.2 MIXED HYPERLIPIDEMIA: ICD-10-CM

## 2025-02-21 RX ORDER — OLMESARTAN MEDOXOMIL 20 MG/1
20 TABLET ORAL DAILY
Qty: 90 TABLET | Refills: 3 | Status: SHIPPED | OUTPATIENT
Start: 2025-02-21 | End: 2025-02-21 | Stop reason: CLARIF

## 2025-02-21 RX ORDER — OLMESARTAN MEDOXOMIL 20 MG/1
20 TABLET ORAL DAILY
Qty: 90 TABLET | Refills: 3 | Status: SHIPPED | OUTPATIENT
Start: 2025-02-21 | End: 2026-02-21

## 2025-02-21 NOTE — PROGRESS NOTES
Subjective:    Patient ID:  Ambar Rivera is a 69 y.o. female patient here for evaluation Results    History of Present Illness:     Ambar Rivera is a 69 y.o. female who follows with Dr. Mason here today for follow up. Last seen in clinic 2/4/2025 (as new patient). She reports episodes of dizziness, although improved. She denies CP, SOB/KANG, palpitations, fatigue, activity intolerance.     During interview, she states that she started drinking shots of pickle juice daily, which has helped her symptoms.     /96 today. Manual recheck by me 144/80. She reports low-normal blood pressure readings at home -- SBP 90s-100s.     Focused Active Problem list includes:  Mitral regurgitation  TTE 2/2025: Moderate MR  Hypertension  Hyperlipidemia       Most Recent Echocardiogram Results  Results for orders placed during the hospital encounter of 02/14/25    Echo    Interpretation Summary    Left Ventricle: The left ventricle is normal in size. Normal wall thickness. There is normal systolic function with a visually estimated ejection fraction of 60 - 65%. There is normal diastolic function.    Right Ventricle: Normal right ventricular cavity size. Wall thickness is normal. Systolic function is normal.    Mitral Valve: There is moderate regurgitation.    Pulmonary Artery: No pulmonary hypertension. The estimated pulmonary artery systolic pressure is 28 mmHg.    IVC/SVC: Normal venous pressure at 3 mmHg.      Most Recent Nuclear Stress Test Results  No results found for this or any previous visit.      Most Recent Cardiac PET Stress Test Results  No results found for this or any previous visit.      Most Recent Cardiovascular Angiogram results  No results found for this or any previous visit.      Other Most Recent Cardiology Results  Results for orders placed during the hospital encounter of 12/22/20    CARDIAC MONITORING STRIPS      REVIEW OF SYSTEMS: As noted in HPI   CARDIOVASCULAR: No recent chest pain,  palpitations, arm/neck/jaw pain, or edema.  RESPIRATORY: No recent fever, cough, SOB.  : No blood in the urine  GI: No reflux, nausea, vomiting, or blood in stool.   MUSCULOSKELETAL: No falls.   NEURO: Dizziness. No headaches, syncope.  EYES: No sudden changes in vision.     Past Medical History:   Diagnosis Date    Hyperlipidemia     Hypertension      Past Surgical History:   Procedure Laterality Date    BILATERAL TUBAL LIGATION  1985    COLONOSCOPY N/A 12/22/2020    Procedure: COLONOSCOPY;  Surgeon: Syed Aguirre MD;  Location: [facility];  Service: Endoscopy;  Laterality: N/A;     Social History[1]      Objective      Vitals:    02/21/25 1115   BP: (!) 144/80   Pulse: 74       The ASCVD Risk score (Pako DK, et al., 2019) failed to calculate for the following reasons:    Cannot find a previous HDL lab    Cannot find a previous total cholesterol lab      LAST EKG  Results for orders placed or performed in visit on 02/04/25   IN OFFICE EKG 12-LEAD (to [place])    Collection Time: 02/04/25  1:49 PM   Result Value Ref Range    QRS Duration 74 ms    [facility] QTC Calculation 450 ms    Narrative    Test Reason : I38,    Vent. Rate :  91 BPM     Atrial Rate :  91 BPM     P-R Int : 138 ms          QRS Dur :  74 ms      QT Int : 366 ms       P-R-T Axes :  29 -26  44 degrees    QTcB Int : 450 ms    Normal sinus rhythm  Minimal voltage criteria for LVH, may be normal variant ( R in aVL )  Borderline Abnormal ECG  When compared with ECG of 30-Nov-2021 06:33,  No significant change was found  Confirmed by Steffen Blank ([id number]) on 2/5/2025 4:41:23 PM    Referred By: AAAREFERRAL SELF           Confirmed By: Steffen Blank     LIPIDS - LAST 2   Lab Results   Component Value Date    CHOL 175 09/24/2021    HDL 53 09/24/2021    LDLCALC 95 09/24/2021    TRIG 159 (H) 09/24/2021     CARDIAC PROFILE - LAST 2  Lab Results   Component Value Date    TROPONINI <0.012 11/30/2021      CBC - LAST 2  Lab Results   Component Value Date    WBC 5.6  "10/18/2023    WBC 4.8 04/21/2023    HGB 11.6 10/18/2023    HGB 11.3 04/21/2023    HCT 37.3 10/18/2023    HCT 37.2 04/21/2023     10/18/2023     04/21/2023     No results found for: "LABPT", "INR", "APTT"  CHEMISTRY - LAST 2  Lab Results   Component Value Date     11/30/2021    K 3.0 (L) 11/30/2021    CO2 30 11/30/2021    BUN 15 11/30/2021    CREATININE 0.82 11/30/2021     (H) 11/30/2021    CALCIUM 9.6 11/30/2021    ALBUMIN 4.3 11/30/2021    ALT 22 11/30/2021    AST 36 11/30/2021      ENDOCRINE - LAST 2  Lab Results   Component Value Date    TSH 1.51 10/18/2023    TSH 3.01 04/21/2023        PHYSICAL EXAM  CONSTITUTIONAL: Well built, well nourished in no apparent distress  NECK: no carotid bruit, no JVD  LUNGS: CTA  CHEST WALL: no tenderness  HEART: regular rate and rhythm, S1, S2 normal, no murmur, click, rub or gallop   ABDOMEN: soft, non-tender; bowel sounds normal; no masses,  no organomegaly  EXTREMITIES: Extremities normal, no edema, no calf tenderness noted  NEURO: AAO X 3    I HAVE REVIEWED :    The vital signs, most recent cardiac testing, and most recent pertinent non-cardiology provider notes.    Current Outpatient Medications   Medication Instructions    atorvastatin (LIPITOR) 20 mg, Nightly    COVID-19 (COMIRNATY 2024-25, 12Y UP,,PF,) 30 mcg/0.3 mL IM vaccine (>/= 11 yo) Intramuscular    olmesartan (BENICAR) 20 mg, Oral, Daily        Assessment & Plan   Primary hypertension  Low readings at home associated with dizziness  -Change olmesartan-HCTZ 40-12.5 mg to olmesartan 20 mg daily    Mixed hyperlipidemia  Stable   Continue atorvastatin 20 mg daily    Valvular heart disease  Stable            Emphasized the importance of modifying lifestyle related risk factors including tobacco avoidance, limiting alcohol intake, aerobic exercise, weight management and Mediterranean diet.      Follow up as scheduled.     Domingo Her NP  [facility]   Office: [phone]   "     [1]   Social History  Tobacco Use    Smoking status: Never    Smokeless tobacco: Never   Substance Use Topics    Alcohol use: Never    Drug use: Never

## 2025-03-05 ENCOUNTER — TELEPHONE (OUTPATIENT)
Dept: CARDIOLOGY | Facility: CLINIC | Age: 70
End: 2025-03-05
Payer: MEDICARE

## 2025-03-05 NOTE — TELEPHONE ENCOUNTER
----- Message from Rekha sent at 3/5/2025  3:18 PM CST -----  Type:  Needs Medical AdviceWho Called: ptWould the patient rather a call back or a response via [facility]? Please callBe Call Back Number: [phone] Information: pt would like a nurse to call in regards to medication   Please call back to advise. Thanks!

## 2025-03-06 NOTE — TELEPHONE ENCOUNTER
Pt needing CV risk for Left Total Knee Arthoplasty with Spinal anesthesia and an adductor block with Dr Sheriff.    Fax: [phone]

## 2025-05-22 ENCOUNTER — TELEPHONE (OUTPATIENT)
Dept: CARDIOLOGY | Facility: CLINIC | Age: 70
End: 2025-05-22

## 2025-05-22 ENCOUNTER — OFFICE VISIT (OUTPATIENT)
Dept: CARDIOLOGY | Facility: CLINIC | Age: 70
End: 2025-05-22
Payer: MEDICARE

## 2025-05-22 VITALS
SYSTOLIC BLOOD PRESSURE: 109 MMHG | DIASTOLIC BLOOD PRESSURE: 70 MMHG | HEART RATE: 80 BPM | WEIGHT: 176.81 LBS | BODY MASS INDEX: 29.46 KG/M2 | HEIGHT: 65 IN

## 2025-05-22 DIAGNOSIS — E78.2 MIXED HYPERLIPIDEMIA: ICD-10-CM

## 2025-05-22 DIAGNOSIS — I38 VALVULAR HEART DISEASE: ICD-10-CM

## 2025-05-22 DIAGNOSIS — I10 PRIMARY HYPERTENSION: Primary | ICD-10-CM

## 2025-05-22 PROCEDURE — 99999 PR PBB SHADOW E&M-EST. PATIENT-LVL III: CPT | Mod: PBBFAC,,,

## 2025-05-22 RX ORDER — ASPIRIN 81 MG/1
1 TABLET ORAL DAILY
COMMUNITY

## 2025-05-22 NOTE — TELEPHONE ENCOUNTER
Surgical Clearance on 6/3/25    Location:[facility]  Procedure: LTK  Anesthesia: Spinal    Bloodthinners: ASA-pls advise  Implants: n/a    Fax: [phone]  Phone: [phone]    Include recent echo/stress test and Office visit

## 2025-05-22 NOTE — PROGRESS NOTES
Subjective:    Patient ID:  Ambar Rivera is a 70 y.o. female patient here for evaluation No chief complaint on file.    History of Present Illness:     Amabr Rivera is a 70 y.o. female who follows with Dr. Mason here today for follow up. Last seen in clinic 2/2025.     She noticed elevated BP readings (140s-150s/70s) at home after I reduced her blood pressure medication.  She went back to her old blood pressure medication (olmesartan-HCTZ 40-25) on her own.  BP has returned to where it was before she saw me in February.  She denies dizziness and other cardiac symptoms.  She is anticipating knee surgery next month.    Focused Active Problem List includes:  Mitral regurgitation  TTE 2/2025: Moderate MR  Hypertension  Hyperlipidemia       Most Recent Echocardiogram Results  Results for orders placed during the hospital encounter of 02/14/25    Echo    Interpretation Summary    Left Ventricle: The left ventricle is normal in size. Normal wall thickness. There is normal systolic function with a visually estimated ejection fraction of 60 - 65%. There is normal diastolic function.    Right Ventricle: Normal right ventricular cavity size. Wall thickness is normal. Systolic function is normal.    Mitral Valve: There is moderate regurgitation.    Pulmonary Artery: No pulmonary hypertension. The estimated pulmonary artery systolic pressure is 28 mmHg.    IVC/SVC: Normal venous pressure at 3 mmHg.      Most Recent Nuclear Stress Test Results  No results found for this or any previous visit.      Most Recent Cardiac PET Stress Test Results  No results found for this or any previous visit.      Most Recent Cardiovascular Angiogram results  No results found for this or any previous visit.      Other Most Recent Cardiology Results  Results for orders placed during the hospital encounter of 12/22/20    CARDIAC MONITORING STRIPS      REVIEW OF SYSTEMS: As noted in HPI   CARDIOVASCULAR: No recent chest pain, palpitations,  arm/neck/jaw pain, or edema.  RESPIRATORY: No recent fever, cough, SOB.  : No blood in the urine  GI: No reflux, nausea, vomiting, or blood in stool.   MUSCULOSKELETAL: No falls.   NEURO: No headaches, syncope, or dizziness.  EYES: No sudden changes in vision.     Past Medical History:   Diagnosis Date    Hyperlipidemia     Hypertension      Past Surgical History:   Procedure Laterality Date    BILATERAL TUBAL LIGATION  1985    COLONOSCOPY N/A 12/22/2020    Procedure: COLONOSCOPY;  Surgeon: Syed Aguirre MD;  Location: [facility];  Service: Endoscopy;  Laterality: N/A;     Social History[1]      Objective      Vitals:    05/22/25 1327   BP: 109/70   Pulse: 80       The ASCVD Risk score (Pako DK, et al., 2019) failed to calculate for the following reasons:    Cannot find a previous HDL lab    Cannot find a previous total cholesterol lab      LAST EKG  Results for orders placed or performed in visit on 02/04/25   IN OFFICE EKG 12-LEAD (to Junction)    Collection Time: 02/04/25  1:49 PM   Result Value Ref Range    QRS Duration 74 ms    [facility] QTC Calculation 450 ms    Narrative    Test Reason : I38,    Vent. Rate :  91 BPM     Atrial Rate :  91 BPM     P-R Int : 138 ms          QRS Dur :  74 ms      QT Int : 366 ms       P-R-T Axes :  29 -26  44 degrees    QTcB Int : 450 ms    Normal sinus rhythm  Minimal voltage criteria for LVH, may be normal variant ( R in aVL )  Borderline Abnormal ECG  When compared with ECG of 30-Nov-2021 06:33,  No significant change was found  Confirmed by Steffen Blank ([id number]) on 2/5/2025 4:41:23 PM    Referred By: AAAREFERRAL SELF           Confirmed By: Steffen Blank     LIPIDS - LAST 2   Lab Results   Component Value Date    CHOL 175 09/24/2021    HDL 53 09/24/2021    LDLCALC 95 09/24/2021    TRIG 159 (H) 09/24/2021     CARDIAC PROFILE - LAST 2  Lab Results   Component Value Date    TROPONINI <0.012 11/30/2021      CBC - LAST 2  Lab Results   Component Value Date    WBC 5.6 10/18/2023     "WBC 4.8 04/21/2023    HGB 11.6 10/18/2023    HGB 11.3 04/21/2023    HCT 37.3 10/18/2023    HCT 37.2 04/21/2023     10/18/2023     04/21/2023     No results found for: "LABPT", "INR", "APTT"  CHEMISTRY - LAST 2  Lab Results   Component Value Date     11/30/2021    K 3.0 (L) 11/30/2021    CO2 30 11/30/2021    BUN 15 11/30/2021    CREATININE 0.82 11/30/2021     (H) 11/30/2021    CALCIUM 9.6 11/30/2021    ALBUMIN 4.3 11/30/2021    ALT 22 11/30/2021    AST 36 11/30/2021      ENDOCRINE - LAST 2  Lab Results   Component Value Date    TSH 1.51 10/18/2023    TSH 3.01 04/21/2023        PHYSICAL EXAM  CONSTITUTIONAL: Well built, well nourished in no apparent distress  NECK: no carotid bruit, no JVD  LUNGS: CTA  CHEST WALL: no tenderness  HEART: regular rate and rhythm, S1, S2 normal, no murmur, click, rub or gallop   ABDOMEN: soft, non-tender; bowel sounds normal; no masses,  no organomegaly  EXTREMITIES: Extremities normal, no edema, no calf tenderness noted  NEURO: AAO X 3    I HAVE REVIEWED :    The vital signs, most recent cardiac testing, and most recent pertinent non-cardiology provider notes.    Current Outpatient Medications   Medication Instructions    atorvastatin (LIPITOR) 20 mg, Nightly    COVID-19 (COMIRNATY 2024-25, 12Y UP,,PF,) 30 mcg/0.3 mL IM vaccine (>/= 13 yo) Intramuscular    olmesartan (BENICAR) 20 mg, Oral, Daily        Assessment & Plan   Hypertension (Primary)  OK to continue olmesartan-HCTZ with instructions to contact our clinic if recurrence of hypotension    Mixed hyperlipidemia  Statin    Mitral regurgitation, moderate  Stable        She is at an acceptable cardiovascular risk for surgery.         I emphasized the importance of modifying lifestyle related risk factors including tobacco avoidance, limiting alcohol intake, aerobic exercise, weight management and Mediterranean diet.      Follow up as scheduled.    Domingo Her NP  [facility]   Office: " [phone]       [1]   Social History  Tobacco Use    Smoking status: Never    Smokeless tobacco: Never   Substance Use Topics    Alcohol use: Never    Drug use: Never